# Patient Record
Sex: FEMALE
[De-identification: names, ages, dates, MRNs, and addresses within clinical notes are randomized per-mention and may not be internally consistent; named-entity substitution may affect disease eponyms.]

---

## 2022-11-11 ENCOUNTER — APPOINTMENT (OUTPATIENT)
Dept: DERMATOLOGY | Facility: CLINIC | Age: 22
End: 2022-11-11

## 2023-03-27 PROBLEM — Z00.00 ENCOUNTER FOR PREVENTIVE HEALTH EXAMINATION: Status: ACTIVE | Noted: 2023-03-27

## 2023-04-04 ENCOUNTER — APPOINTMENT (OUTPATIENT)
Dept: ORTHOPEDIC SURGERY | Facility: CLINIC | Age: 23
End: 2023-04-04
Payer: COMMERCIAL

## 2023-04-04 VITALS — HEIGHT: 60 IN | WEIGHT: 120 LBS | BODY MASS INDEX: 23.56 KG/M2

## 2023-04-04 DIAGNOSIS — Z78.9 OTHER SPECIFIED HEALTH STATUS: ICD-10-CM

## 2023-04-04 PROCEDURE — 73110 X-RAY EXAM OF WRIST: CPT | Mod: LT

## 2023-04-04 NOTE — DATA REVIEWED
[Outside X-rays] : outside x-rays [Left] : left [Wrist] : wrist [FreeTextEntry1] : [facility] 3/14/23: No abnormalities.

## 2023-04-04 NOTE — DISCUSSION/SUMMARY
[de-identified] : Discussed the nature of the diagnosis and risk and benefits of different modalities of treatment.\par Xrays reviewed from [facility]. loaded into PACS. \par Recommended immobilization.\par Cock up splinting, warm compress and NSAIDs.\par RTO 4 weeks.

## 2023-04-04 NOTE — PHYSICAL EXAM
[Volar Wrist] : volar wrist [Distal Radius] : distal radius [First Dorsal Compartment] : first dorsal compartment [Anatomic Snuff Box] : anatomic snuff box [TFCC] : TFCC [Left] : left wrist [There are no fractures, subluxations or dislocations. No significant abnormalities are seen] : There are no fractures, subluxations or dislocations. No significant abnormalities are seen [] : negative tinel [de-identified] : forced pronation

## 2023-04-04 NOTE — HISTORY OF PRESENT ILLNESS
[7] : 7 [0] : 0 [Dull/Aching] : dull/aching [Radiating] : radiating [Full time] : Work status: full time [de-identified] : 22 year old female presenting with LT wrist pain after a fall on 2/19/23. She had pain right away but thought it was just a sprain. She went to [facility] 3/14/23 which was negative for fx. Told to follow up with orhto. She states her pain improved but started hurting again last week again. She has minimal pain at this time. \par \par Occupation: Advertisement [] : Post Surgical Visit: no [FreeTextEntry1] : L wrist  [FreeTextEntry3] : 2/21/23 [FreeTextEntry5] : 21 y/o LHD F eval L wrist s/p trip and fall on above [place]. Prior TX of XR by [facility] on 3/14/23. pt states pain has worsened since initial injury  [FreeTextEntry7] : From forearm to thumb (L)  [de-identified] : XR [de-identified] : [employer]

## 2023-05-02 ENCOUNTER — APPOINTMENT (OUTPATIENT)
Dept: ORTHOPEDIC SURGERY | Facility: CLINIC | Age: 23
End: 2023-05-02
Payer: COMMERCIAL

## 2023-05-02 VITALS — HEIGHT: 60 IN | WEIGHT: 120 LBS | BODY MASS INDEX: 23.56 KG/M2

## 2023-05-02 DIAGNOSIS — M65.4 RADIAL STYLOID TENOSYNOVITIS [DE QUERVAIN]: ICD-10-CM

## 2023-05-02 DIAGNOSIS — S69.82XA OTHER SPECIFIED INJURIES OF LEFT WRIST, HAND AND FINGER(S), INITIAL ENCOUNTER: ICD-10-CM

## 2023-05-02 PROCEDURE — 99213 OFFICE O/P EST LOW 20 MIN: CPT

## 2023-05-02 NOTE — PHYSICAL EXAM
[Left] : left hand [First Dorsal Compartment] : first dorsal compartment [] : positive Finkelstein's [FreeTextEntry3] : No TFCC tenderness

## 2023-05-02 NOTE — HISTORY OF PRESENT ILLNESS
[4] : 4 [0] : 0 [Dull/Aching] : dull/aching [Radiating] : radiating [Full time] : Work status: full time [de-identified] : 22 year old female followed for LEFT de Quervain's and LEFT TFCC injury from a fall 2 1/2 months ago.  She was last seen 4 weeks ago, where splinting was recommended.  She reports improvement, but not resolution of her pain [] : Post Surgical Visit: no [FreeTextEntry1] : L wrist  [FreeTextEntry3] : 2/21/23 [FreeTextEntry5] : 21 y/o LHD F eval L wrist s/p trip and fall on above [place]. Pt states condition has improved since last visit but some pain still persists  [FreeTextEntry7] : From forearm to thumb (L)  [de-identified] : XR [de-identified] : [employer]

## 2023-05-02 NOTE — DISCUSSION/SUMMARY
[de-identified] : Discussed the nature of the diagnosis and risk and benefits of different modalities of treatment.\par Discussed a CSI if she is not satisfied with her progress.\par She will continue with conservative management.\par Wean from splint. \par TFCC non tender today.\par RTO 3 weeks.

## 2023-06-13 ENCOUNTER — APPOINTMENT (OUTPATIENT)
Dept: ORTHOPEDIC SURGERY | Facility: CLINIC | Age: 23
End: 2023-06-13